# Patient Record
Sex: FEMALE | Race: BLACK OR AFRICAN AMERICAN
[De-identification: names, ages, dates, MRNs, and addresses within clinical notes are randomized per-mention and may not be internally consistent; named-entity substitution may affect disease eponyms.]

---

## 2006-08-19 VITALS — TEMPERATURE: 98.3 F | TEMPERATURE: 98.3 F | TEMPERATURE: 98.3 F | TEMPERATURE: 98.3 F

## 2018-10-05 ENCOUNTER — HOSPITAL ENCOUNTER (EMERGENCY)
Dept: HOSPITAL 19 - COL.ER | Age: 38
Discharge: HOME | End: 2018-10-05
Payer: MEDICAID

## 2018-10-05 VITALS — BODY MASS INDEX: 29.12 KG/M2 | WEIGHT: 160.28 LBS | HEIGHT: 62.01 IN

## 2018-10-05 VITALS — SYSTOLIC BLOOD PRESSURE: 131 MMHG | DIASTOLIC BLOOD PRESSURE: 92 MMHG | HEART RATE: 80 BPM

## 2018-10-05 VITALS — TEMPERATURE: 99.3 F

## 2018-10-05 DIAGNOSIS — F41.9: ICD-10-CM

## 2018-10-05 DIAGNOSIS — Z98.51: ICD-10-CM

## 2018-10-05 DIAGNOSIS — Z98.890: ICD-10-CM

## 2018-10-05 DIAGNOSIS — N92.0: Primary | ICD-10-CM

## 2018-10-05 LAB
ALBUMIN SERPL-MCNC: 3.7 GM/DL (ref 3.5–5)
ALP SERPL-CCNC: 68 U/L (ref 50–136)
ALT SERPL-CCNC: 33 U/L (ref 9–52)
ANION GAP SERPL CALC-SCNC: 3 MMOL/L (ref 7–16)
AST SERPL-CCNC: 25 U/L (ref 15–37)
BASOPHILS # BLD: 0 10*3/UL (ref 0–0.2)
BASOPHILS NFR BLD AUTO: 0.2 % (ref 0–2)
BILIRUB SERPL-MCNC: 0.2 MG/DL (ref 0–1)
BUN SERPL-MCNC: 12 MG/DL (ref 7–17)
CALCIUM SERPL-MCNC: 8.5 MG/DL (ref 8.4–10.2)
CHLORIDE SERPL-SCNC: 103 MMOL/L (ref 98–107)
CO2 SERPL-SCNC: 31 MMOL/L (ref 22–30)
CREAT SERPL-SCNC: 0.74 MG/DL (ref 0.52–1.25)
EOSINOPHIL # BLD: 0 10*3/UL (ref 0–0.7)
EOSINOPHIL NFR BLD: 0 % (ref 0–4)
ERYTHROCYTE [DISTWIDTH] IN BLOOD BY AUTOMATED COUNT: 13.2 % (ref 11.5–14.5)
GLUCOSE SERPL-MCNC: 90 MG/DL (ref 74–106)
GRANULOCYTES # BLD AUTO: 51.5 % (ref 42.2–75.2)
HCT VFR BLD AUTO: 39.2 % (ref 37–47)
HGB BLD-MCNC: 13.5 G/DL (ref 12.5–16)
LYMPHOCYTES # BLD: 1.5 10*3/UL (ref 1.2–3.4)
LYMPHOCYTES NFR BLD: 37.2 % (ref 20–51)
MCH RBC QN AUTO: 30 PG (ref 27–31)
MCHC RBC AUTO-ENTMCNC: 34 G/DL (ref 33–37)
MCV RBC AUTO: 86 FL (ref 80–100)
MONOCYTES # BLD: 0.5 10*3/UL (ref 0.1–0.6)
MONOCYTES NFR BLD AUTO: 11.1 % (ref 1.7–9.3)
NEUTROPHILS # BLD: 2.1 10*3/UL (ref 1.4–6.5)
PLATELET # BLD AUTO: 262 K/MM3 (ref 130–400)
PMV BLD AUTO: 10.6 FL (ref 7.4–10.4)
POTASSIUM SERPL-SCNC: 4.2 MMOL/L (ref 3.4–5)
PROT SERPL-MCNC: 7.1 GM/DL (ref 6.4–8.2)
RBC # BLD AUTO: 4.56 M/MM3 (ref 4.1–5.3)
SODIUM SERPL-SCNC: 136 MMOL/L (ref 137–145)

## 2018-12-04 ENCOUNTER — HOSPITAL ENCOUNTER (EMERGENCY)
Dept: HOSPITAL 19 - COL.ER | Age: 38
Discharge: HOME | End: 2018-12-04
Payer: MEDICAID

## 2018-12-04 VITALS — HEART RATE: 78 BPM

## 2018-12-04 VITALS — BODY MASS INDEX: 30.26 KG/M2 | WEIGHT: 160.28 LBS | HEIGHT: 60.98 IN

## 2018-12-04 VITALS — DIASTOLIC BLOOD PRESSURE: 92 MMHG | TEMPERATURE: 98.2 F | SYSTOLIC BLOOD PRESSURE: 135 MMHG

## 2018-12-04 DIAGNOSIS — F20.9: ICD-10-CM

## 2018-12-04 DIAGNOSIS — Z98.890: ICD-10-CM

## 2018-12-04 DIAGNOSIS — F12.90: ICD-10-CM

## 2018-12-04 DIAGNOSIS — F32.9: ICD-10-CM

## 2018-12-04 DIAGNOSIS — N39.0: ICD-10-CM

## 2018-12-04 DIAGNOSIS — R45.851: Primary | ICD-10-CM

## 2018-12-04 DIAGNOSIS — F17.210: ICD-10-CM

## 2018-12-04 LAB
ALBUMIN SERPL-MCNC: 3.9 GM/DL (ref 3.5–5)
ALP SERPL-CCNC: 78 U/L (ref 50–136)
ALT SERPL-CCNC: 37 U/L (ref 9–52)
ANION GAP SERPL CALC-SCNC: 2 MMOL/L (ref 7–16)
APAP SERPL-MCNC: < 10 UG/ML (ref 10–30)
AST SERPL-CCNC: 32 U/L (ref 15–37)
BASOPHILS # BLD: 0 10*3/UL (ref 0–0.2)
BASOPHILS NFR BLD AUTO: 0.2 % (ref 0–2)
BILIRUB SERPL-MCNC: 0.2 MG/DL (ref 0–1)
BUN SERPL-MCNC: 11 MG/DL (ref 7–17)
CALCIUM SERPL-MCNC: 9 MG/DL (ref 8.4–10.2)
CHLORIDE SERPL-SCNC: 109 MMOL/L (ref 98–107)
CO2 SERPL-SCNC: 31 MMOL/L (ref 22–30)
CREAT SERPL-SCNC: 0.9 MG/DL (ref 0.52–1.25)
DRUGS UR SCN NOM: NEGATIVE NG/ML
EOSINOPHIL # BLD: 0 10*3/UL (ref 0–0.7)
EOSINOPHIL NFR BLD: 0 % (ref 0–4)
ERYTHROCYTE [DISTWIDTH] IN BLOOD BY AUTOMATED COUNT: 12.6 % (ref 11.5–14.5)
ETHANOL SPEC-SCNC: < 10 MG/DL
GLUCOSE SERPL-MCNC: 89 MG/DL (ref 74–106)
GRANULOCYTES # BLD AUTO: 57.8 % (ref 42.2–75.2)
HCT VFR BLD AUTO: 41.1 % (ref 37–47)
HGB BLD-MCNC: 14.3 G/DL (ref 12.5–16)
LYMPHOCYTES # BLD: 2 10*3/UL (ref 1.2–3.4)
LYMPHOCYTES NFR BLD: 34.9 % (ref 20–51)
MCH RBC QN AUTO: 30 PG (ref 27–31)
MCHC RBC AUTO-ENTMCNC: 35 G/DL (ref 33–37)
MCV RBC AUTO: 86 FL (ref 80–100)
MONOCYTES # BLD: 0.4 10*3/UL (ref 0.1–0.6)
MONOCYTES NFR BLD AUTO: 6.9 % (ref 1.7–9.3)
NEUTROPHILS # BLD: 3.3 10*3/UL (ref 1.4–6.5)
PH UR STRIP.AUTO: 6 [PH] (ref 5–8)
PLATELET # BLD AUTO: 301 K/MM3 (ref 130–400)
PMV BLD AUTO: 9.8 FL (ref 7.4–10.4)
POTASSIUM SERPL-SCNC: 3.8 MMOL/L (ref 3.4–5)
PROT SERPL-MCNC: 7.3 GM/DL (ref 6.4–8.2)
RBC # BLD AUTO: 4.77 M/MM3 (ref 4.1–5.3)
RBC # UR STRIP.AUTO: (no result) /UL
RBC # UR: >50 /HPF
SALICYLATES SERPL-MCNC: < 1 MG/DL
SODIUM SERPL-SCNC: 142 MMOL/L (ref 137–145)
SP GR UR STRIP.AUTO: 1.01 (ref 1–1.03)
SQUAMOUS # URNS: (no result) /HPF
UA DIPSTICK PNL UR STRIP.AUTO: (no result)
URINE LEUKOCYTE ESTERASE: (no result)
URN COLLECT METHOD CLASS: (no result)

## 2019-04-27 ENCOUNTER — HOSPITAL ENCOUNTER (EMERGENCY)
Dept: HOSPITAL 19 - COL.ER | Age: 39
Discharge: HOME | End: 2019-04-27
Payer: MEDICAID

## 2019-04-27 VITALS — WEIGHT: 140.21 LBS | BODY MASS INDEX: 25.48 KG/M2 | HEIGHT: 62.01 IN

## 2019-04-27 VITALS — DIASTOLIC BLOOD PRESSURE: 93 MMHG | HEART RATE: 123 BPM | TEMPERATURE: 98 F | SYSTOLIC BLOOD PRESSURE: 140 MMHG

## 2019-04-27 DIAGNOSIS — Z11.3: ICD-10-CM

## 2019-04-27 DIAGNOSIS — Z98.51: ICD-10-CM

## 2019-04-27 DIAGNOSIS — F20.9: ICD-10-CM

## 2019-04-27 DIAGNOSIS — F17.210: ICD-10-CM

## 2019-04-27 DIAGNOSIS — Z88.0: ICD-10-CM

## 2019-04-27 DIAGNOSIS — H10.9: Primary | ICD-10-CM

## 2019-07-09 ENCOUNTER — HOSPITAL ENCOUNTER (EMERGENCY)
Dept: HOSPITAL 19 - COL.ER | Age: 39
LOS: 1 days | Discharge: HOME | End: 2019-07-10
Payer: MEDICAID

## 2019-07-09 VITALS — HEIGHT: 60.98 IN | BODY MASS INDEX: 28.39 KG/M2 | WEIGHT: 150.36 LBS

## 2019-07-09 VITALS — TEMPERATURE: 98.3 F

## 2019-07-09 DIAGNOSIS — Z53.21: ICD-10-CM

## 2019-07-09 DIAGNOSIS — R42: Primary | ICD-10-CM

## 2019-07-10 VITALS — DIASTOLIC BLOOD PRESSURE: 84 MMHG | HEART RATE: 83 BPM | SYSTOLIC BLOOD PRESSURE: 126 MMHG

## 2019-07-13 ENCOUNTER — HOSPITAL ENCOUNTER (EMERGENCY)
Dept: HOSPITAL 19 - COL.ER | Age: 39
Discharge: HOME | End: 2019-07-13
Payer: MEDICAID

## 2019-07-13 VITALS — HEIGHT: 60.98 IN | BODY MASS INDEX: 32.22 KG/M2 | WEIGHT: 170.64 LBS

## 2019-07-13 VITALS — DIASTOLIC BLOOD PRESSURE: 80 MMHG | TEMPERATURE: 99.4 F | SYSTOLIC BLOOD PRESSURE: 137 MMHG | HEART RATE: 126 BPM

## 2019-07-13 DIAGNOSIS — Z20.2: Primary | ICD-10-CM

## 2020-02-27 ENCOUNTER — HOSPITAL ENCOUNTER (EMERGENCY)
Dept: HOSPITAL 19 - COL.ER | Age: 40
Discharge: HOME | End: 2020-02-27
Payer: MEDICAID

## 2020-02-27 VITALS — TEMPERATURE: 98.8 F | DIASTOLIC BLOOD PRESSURE: 64 MMHG | SYSTOLIC BLOOD PRESSURE: 120 MMHG

## 2020-02-27 VITALS — HEIGHT: 60.98 IN | BODY MASS INDEX: 30.26 KG/M2 | WEIGHT: 160.28 LBS

## 2020-02-27 VITALS — HEART RATE: 89 BPM

## 2020-02-27 DIAGNOSIS — Z98.51: ICD-10-CM

## 2020-02-27 DIAGNOSIS — F17.210: ICD-10-CM

## 2020-02-27 DIAGNOSIS — N76.4: Primary | ICD-10-CM

## 2020-03-13 ENCOUNTER — HOSPITAL ENCOUNTER (EMERGENCY)
Dept: HOSPITAL 19 - COL.ER | Age: 40
Discharge: PSYCHIATRIC OR SUBSTANCE USE FACILITY | End: 2020-03-13
Payer: MEDICAID

## 2020-03-13 VITALS — DIASTOLIC BLOOD PRESSURE: 85 MMHG | SYSTOLIC BLOOD PRESSURE: 115 MMHG

## 2020-03-13 VITALS — WEIGHT: 175.27 LBS | HEIGHT: 62.01 IN | BODY MASS INDEX: 31.85 KG/M2

## 2020-03-13 VITALS — TEMPERATURE: 98.1 F

## 2020-03-13 VITALS — HEART RATE: 133 BPM

## 2020-03-13 DIAGNOSIS — F32.9: Primary | ICD-10-CM

## 2020-03-13 DIAGNOSIS — F17.210: ICD-10-CM

## 2020-03-13 LAB
ALBUMIN SERPL-MCNC: 4 GM/DL (ref 3.5–5)
ALP SERPL-CCNC: 75 U/L (ref 50–136)
ALT SERPL-CCNC: 63 U/L (ref 4–34)
ANION GAP SERPL CALC-SCNC: 6 MMOL/L (ref 7–16)
APAP SERPL-MCNC: < 10 UG/ML (ref 10–30)
AST SERPL-CCNC: 45 U/L (ref 15–37)
BASOPHILS # BLD: 0 10*3/UL (ref 0–0.2)
BASOPHILS NFR BLD AUTO: 0.3 % (ref 0–2)
BILIRUB SERPL-MCNC: 0.2 MG/DL (ref 0–1)
BUN SERPL-MCNC: 15 MG/DL (ref 7–17)
CALCIUM SERPL-MCNC: 8.9 MG/DL (ref 8.4–10.2)
CHLORIDE SERPL-SCNC: 106 MMOL/L (ref 98–107)
CO2 SERPL-SCNC: 28 MMOL/L (ref 22–30)
CREAT SERPL-SCNC: 0.78 UMOL/L (ref 0.52–1.25)
DRUGS UR SCN NOM: NEGATIVE NG/ML
EOSINOPHIL # BLD: 0 10*3/UL (ref 0–0.7)
EOSINOPHIL NFR BLD: 0 % (ref 0–4)
ERYTHROCYTE [DISTWIDTH] IN BLOOD BY AUTOMATED COUNT: 14.1 % (ref 11.5–14.5)
ETHANOL SPEC-SCNC: < 10 MG/DL
GLUCOSE SERPL-MCNC: 108 MG/DL (ref 74–106)
GRANULOCYTES # BLD AUTO: 66 % (ref 42.2–75.2)
HCG SERPL QL: NEGATIVE
HCT VFR BLD AUTO: 36.9 % (ref 37–47)
HGB BLD-MCNC: 12.5 G/DL (ref 12.5–16)
LYMPHOCYTES # BLD: 1.7 10*3/UL (ref 1.2–3.4)
LYMPHOCYTES NFR BLD: 26 % (ref 20–51)
MCH RBC QN AUTO: 29 PG (ref 27–31)
MCHC RBC AUTO-ENTMCNC: 34 G/DL (ref 33–37)
MCV RBC AUTO: 85 FL (ref 80–100)
MONOCYTES # BLD: 0.5 10*3/UL (ref 0.1–0.6)
MONOCYTES NFR BLD AUTO: 7.4 % (ref 1.7–9.3)
NEUTROPHILS # BLD: 4.3 10*3/UL (ref 1.4–6.5)
PH UR STRIP.AUTO: 6 [PH] (ref 5–8)
PLATELET # BLD AUTO: 261 K/MM3 (ref 130–400)
PMV BLD AUTO: 10 FL (ref 7.4–10.4)
POTASSIUM SERPL-SCNC: 4.1 MMOL/L (ref 3.4–5)
PROT SERPL-MCNC: 7.4 GM/DL (ref 6.4–8.2)
RBC # BLD AUTO: 4.33 M/MM3 (ref 4.1–5.3)
RBC # UR STRIP.AUTO: (no result) /UL
RBC # UR: (no result) /HPF
SALICYLATES SERPL-MCNC: < 1 MG/DL
SODIUM SERPL-SCNC: 140 MMOL/L (ref 137–145)
SP GR UR STRIP.AUTO: 1.01 (ref 1–1.03)
SQUAMOUS # URNS: (no result) /HPF
URN COLLECT METHOD CLASS: (no result)

## 2020-04-18 ENCOUNTER — HOSPITAL ENCOUNTER (EMERGENCY)
Dept: HOSPITAL 19 - COL.ER | Age: 40
LOS: 2 days | Discharge: PSYCHIATRIC OR SUBSTANCE USE FACILITY | End: 2020-04-20
Payer: MEDICAID

## 2020-04-18 VITALS — HEIGHT: 62.01 IN | WEIGHT: 192.46 LBS | BODY MASS INDEX: 34.97 KG/M2

## 2020-04-18 DIAGNOSIS — F17.210: ICD-10-CM

## 2020-04-18 DIAGNOSIS — F20.9: Primary | ICD-10-CM

## 2020-04-18 DIAGNOSIS — Z98.51: ICD-10-CM

## 2020-04-18 LAB
ALBUMIN SERPL-MCNC: 4.6 GM/DL (ref 3.5–5)
ALP SERPL-CCNC: 99 U/L (ref 50–136)
ALT SERPL-CCNC: 39 U/L (ref 4–34)
ANION GAP SERPL CALC-SCNC: 7 MMOL/L (ref 7–16)
APAP SERPL-MCNC: < 10 UG/ML (ref 10–30)
AST SERPL-CCNC: 39 U/L (ref 15–37)
BASOPHILS # BLD: 0.1 10*3/UL (ref 0–0.2)
BASOPHILS NFR BLD AUTO: 0.7 % (ref 0–2)
BILIRUB SERPL-MCNC: 0.4 MG/DL (ref 0–1)
BUN SERPL-MCNC: 14 MG/DL (ref 7–17)
CALCIUM SERPL-MCNC: 10.1 MG/DL (ref 8.4–10.2)
CHLORIDE SERPL-SCNC: 101 MMOL/L (ref 98–107)
CO2 SERPL-SCNC: 28 MMOL/L (ref 22–30)
CREAT SERPL-SCNC: 0.77 UMOL/L (ref 0.52–1.25)
DRUGS UR SCN NOM: NEGATIVE NG/ML
EOSINOPHIL # BLD: 0 10*3/UL (ref 0–0.7)
EOSINOPHIL NFR BLD: 0 % (ref 0–4)
ERYTHROCYTE [DISTWIDTH] IN BLOOD BY AUTOMATED COUNT: 13.5 % (ref 11.5–14.5)
ETHANOL SPEC-SCNC: < 10 MG/DL
GLUCOSE SERPL-MCNC: 109 MG/DL (ref 74–106)
GRANULOCYTES # BLD AUTO: 64.5 % (ref 42.2–75.2)
HCG SERPL QL: NEGATIVE
HCT VFR BLD AUTO: 37.7 % (ref 37–47)
HGB BLD-MCNC: 13.2 G/DL (ref 12.5–16)
LYMPHOCYTES # BLD: 2 10*3/UL (ref 1.2–3.4)
LYMPHOCYTES NFR BLD: 24.6 % (ref 20–51)
MAGNESIUM SERPL-MCNC: 1.8 MG/DL (ref 1.6–2.3)
MCH RBC QN AUTO: 29 PG (ref 27–31)
MCHC RBC AUTO-ENTMCNC: 35 G/DL (ref 33–37)
MCV RBC AUTO: 81 FL (ref 80–100)
MONOCYTES # BLD: 0.8 10*3/UL (ref 0.1–0.6)
MONOCYTES NFR BLD AUTO: 9.8 % (ref 1.7–9.3)
NEUTROPHILS # BLD: 5.2 10*3/UL (ref 1.4–6.5)
PH UR STRIP.AUTO: 7 [PH] (ref 5–8)
PHOSPHATE SERPL-MCNC: 5.4 MG/DL (ref 2.5–4.5)
PLATELET # BLD AUTO: 284 K/MM3 (ref 130–400)
PMV BLD AUTO: 9.7 FL (ref 7.4–10.4)
POTASSIUM SERPL-SCNC: 4.3 MMOL/L (ref 3.4–5)
PROT SERPL-MCNC: 8.3 GM/DL (ref 6.4–8.2)
RBC # BLD AUTO: 4.63 M/MM3 (ref 4.1–5.3)
RBC # UR: (no result) /HPF
SALICYLATES SERPL-MCNC: < 1 MG/DL
SODIUM SERPL-SCNC: 136 MMOL/L (ref 137–145)
SP GR UR STRIP.AUTO: 1 (ref 1–1.03)
TSH SERPL DL<=0.005 MIU/L-ACNC: 3.87 UIU/ML (ref 0.47–4.68)
URN COLLECT METHOD CLASS: (no result)

## 2020-04-20 VITALS — HEART RATE: 100 BPM | DIASTOLIC BLOOD PRESSURE: 82 MMHG | SYSTOLIC BLOOD PRESSURE: 128 MMHG

## 2020-04-20 VITALS — TEMPERATURE: 97.5 F

## 2020-05-11 ENCOUNTER — HOSPITAL ENCOUNTER (EMERGENCY)
Dept: HOSPITAL 19 - COL.ER | Age: 40
Discharge: HOME | End: 2020-05-11
Payer: MEDICAID

## 2020-05-11 VITALS — SYSTOLIC BLOOD PRESSURE: 123 MMHG | DIASTOLIC BLOOD PRESSURE: 82 MMHG | TEMPERATURE: 97.8 F

## 2020-05-11 VITALS — BODY MASS INDEX: 39.33 KG/M2 | WEIGHT: 208.34 LBS | HEIGHT: 60.98 IN

## 2020-05-11 VITALS — HEART RATE: 85 BPM

## 2020-05-11 DIAGNOSIS — F32.9: ICD-10-CM

## 2020-05-11 DIAGNOSIS — Z79.84: ICD-10-CM

## 2020-05-11 DIAGNOSIS — R10.13: Primary | ICD-10-CM

## 2020-05-11 LAB
ALBUMIN SERPL-MCNC: 4 GM/DL (ref 3.5–5)
ALP SERPL-CCNC: 80 U/L (ref 50–136)
ALT SERPL-CCNC: 40 U/L (ref 4–34)
ANION GAP SERPL CALC-SCNC: 4 MMOL/L (ref 7–16)
AST SERPL-CCNC: 32 U/L (ref 15–37)
BASOPHILS # BLD: 0.1 10*3/UL (ref 0–0.2)
BASOPHILS NFR BLD AUTO: 0.9 % (ref 0–2)
BILIRUB SERPL-MCNC: 0.3 MG/DL (ref 0–1)
BUN SERPL-MCNC: 15 MG/DL (ref 7–17)
CALCIUM SERPL-MCNC: 9.2 MG/DL (ref 8.4–10.2)
CHLORIDE SERPL-SCNC: 102 MMOL/L (ref 98–107)
CO2 SERPL-SCNC: 28 MMOL/L (ref 22–30)
CREAT SERPL-SCNC: 0.78 UMOL/L (ref 0.52–1.25)
CRP SERPL-MCNC: < 0.5 MG/DL (ref 0–0.9)
EOSINOPHIL # BLD: 0.1 10*3/UL (ref 0–0.7)
EOSINOPHIL NFR BLD: 1.9 % (ref 0–4)
ERYTHROCYTE [DISTWIDTH] IN BLOOD BY AUTOMATED COUNT: 13.7 % (ref 11.5–14.5)
GLUCOSE SERPL-MCNC: 102 MG/DL (ref 74–106)
GRANULOCYTES # BLD AUTO: 45.8 % (ref 42.2–75.2)
HCG SERPL QL: NEGATIVE
HCT VFR BLD AUTO: 35.5 % (ref 37–47)
HGB BLD-MCNC: 12.1 G/DL (ref 12.5–16)
LIPASE SERPL-CCNC: 51 U/L (ref 23–300)
LYMPHOCYTES # BLD: 2.4 10*3/UL (ref 1.2–3.4)
LYMPHOCYTES NFR BLD: 41.9 % (ref 20–51)
MCH RBC QN AUTO: 29 PG (ref 27–31)
MCHC RBC AUTO-ENTMCNC: 34 G/DL (ref 33–37)
MCV RBC AUTO: 84 FL (ref 80–100)
MONOCYTES # BLD: 0.5 10*3/UL (ref 0.1–0.6)
MONOCYTES NFR BLD AUTO: 9 % (ref 1.7–9.3)
NEUTROPHILS # BLD: 2.7 10*3/UL (ref 1.4–6.5)
PLATELET # BLD AUTO: 279 K/MM3 (ref 130–400)
PMV BLD AUTO: 9.8 FL (ref 7.4–10.4)
POTASSIUM SERPL-SCNC: 4.1 MMOL/L (ref 3.4–5)
PROT SERPL-MCNC: 7.6 GM/DL (ref 6.4–8.2)
RBC # BLD AUTO: 4.24 M/MM3 (ref 4.1–5.3)
SODIUM SERPL-SCNC: 134 MMOL/L (ref 137–145)

## 2020-05-11 PROCEDURE — C9113 INJ PANTOPRAZOLE SODIUM, VIA: HCPCS

## 2020-06-07 ENCOUNTER — HOSPITAL ENCOUNTER (EMERGENCY)
Dept: HOSPITAL 19 - COL.ER | Age: 40
Discharge: HOME | End: 2020-06-07
Payer: MEDICAID

## 2020-06-07 VITALS — DIASTOLIC BLOOD PRESSURE: 89 MMHG | SYSTOLIC BLOOD PRESSURE: 122 MMHG | HEART RATE: 89 BPM

## 2020-06-07 VITALS — WEIGHT: 215.39 LBS | BODY MASS INDEX: 40.67 KG/M2 | HEIGHT: 60.98 IN

## 2020-06-07 VITALS — TEMPERATURE: 98.3 F

## 2020-06-07 DIAGNOSIS — F22: ICD-10-CM

## 2020-06-07 DIAGNOSIS — F20.9: ICD-10-CM

## 2020-06-07 DIAGNOSIS — J90: Primary | ICD-10-CM

## 2020-06-07 DIAGNOSIS — F17.210: ICD-10-CM

## 2020-06-07 DIAGNOSIS — R07.89: ICD-10-CM

## 2020-06-07 LAB
ALBUMIN SERPL-MCNC: 3.9 GM/DL (ref 3.5–5)
ALP SERPL-CCNC: 93 U/L (ref 50–136)
ALT SERPL-CCNC: 17 U/L (ref 4–34)
ANION GAP SERPL CALC-SCNC: 7 MMOL/L (ref 7–16)
AST SERPL-CCNC: 28 U/L (ref 15–37)
BASOPHILS # BLD: 0 10*3/UL (ref 0–0.2)
BASOPHILS NFR BLD AUTO: 0.5 % (ref 0–2)
BILIRUB SERPL-MCNC: 0.5 MG/DL (ref 0–1)
BUN SERPL-MCNC: 17 MG/DL (ref 7–17)
CALCIUM SERPL-MCNC: 9.4 MG/DL (ref 8.4–10.2)
CHLORIDE SERPL-SCNC: 102 MMOL/L (ref 98–107)
CO2 SERPL-SCNC: 28 MMOL/L (ref 22–30)
CREAT SERPL-SCNC: 0.9 UMOL/L (ref 0.52–1.25)
EOSINOPHIL # BLD: 0 10*3/UL (ref 0–0.7)
EOSINOPHIL NFR BLD: 0 % (ref 0–4)
ERYTHROCYTE [DISTWIDTH] IN BLOOD BY AUTOMATED COUNT: 13.1 % (ref 11.5–14.5)
GLUCOSE SERPL-MCNC: 84 MG/DL (ref 74–106)
GRANULOCYTES # BLD AUTO: 60.5 % (ref 42.2–75.2)
HCG SERPL QL: NEGATIVE
HCT VFR BLD AUTO: 36.6 % (ref 37–47)
HGB BLD-MCNC: 12.6 G/DL (ref 12.5–16)
LYMPHOCYTES # BLD: 2.3 10*3/UL (ref 1.2–3.4)
LYMPHOCYTES NFR BLD: 30.2 % (ref 20–51)
MCH RBC QN AUTO: 28 PG (ref 27–31)
MCHC RBC AUTO-ENTMCNC: 34 G/DL (ref 33–37)
MCV RBC AUTO: 81 FL (ref 80–100)
MONOCYTES # BLD: 0.7 10*3/UL (ref 0.1–0.6)
MONOCYTES NFR BLD AUTO: 8.5 % (ref 1.7–9.3)
NEUTROPHILS # BLD: 4.6 10*3/UL (ref 1.4–6.5)
PLATELET # BLD AUTO: 258 K/MM3 (ref 130–400)
PMV BLD AUTO: 10.5 FL (ref 7.4–10.4)
POTASSIUM SERPL-SCNC: 4.2 MMOL/L (ref 3.4–5)
PROT SERPL-MCNC: 7.4 GM/DL (ref 6.4–8.2)
RBC # BLD AUTO: 4.51 M/MM3 (ref 4.1–5.3)
SODIUM SERPL-SCNC: 136 MMOL/L (ref 137–145)
TROPONIN I SERPL-MCNC: < 0.012 NG/ML (ref 0–0.04)

## 2020-06-12 ENCOUNTER — HOSPITAL ENCOUNTER (EMERGENCY)
Dept: HOSPITAL 19 - COL.ER | Age: 40
Discharge: LEFT BEFORE DISCHARGE | End: 2020-06-12
Payer: MEDICAID

## 2020-06-12 VITALS — DIASTOLIC BLOOD PRESSURE: 77 MMHG | TEMPERATURE: 98.3 F | SYSTOLIC BLOOD PRESSURE: 110 MMHG | HEART RATE: 90 BPM

## 2020-06-12 VITALS — BODY MASS INDEX: 39.14 KG/M2 | HEIGHT: 62.01 IN | WEIGHT: 215.39 LBS

## 2020-06-12 DIAGNOSIS — R06.02: Primary | ICD-10-CM

## 2020-06-12 DIAGNOSIS — R07.89: ICD-10-CM

## 2020-06-18 ENCOUNTER — HOSPITAL ENCOUNTER (OUTPATIENT)
Dept: HOSPITAL 19 - COL.RAD | Age: 40
End: 2020-06-18
Attending: NURSE PRACTITIONER
Payer: MEDICAID

## 2020-06-18 DIAGNOSIS — R60.0: Primary | ICD-10-CM

## 2020-06-18 DIAGNOSIS — R05: ICD-10-CM

## 2020-06-23 ENCOUNTER — HOSPITAL ENCOUNTER (OUTPATIENT)
Dept: HOSPITAL 19 - COL.PUL | Age: 40
End: 2020-06-23
Attending: INTERNAL MEDICINE
Payer: MEDICAID

## 2020-06-23 DIAGNOSIS — J45.909: ICD-10-CM

## 2020-06-23 DIAGNOSIS — J90: Primary | ICD-10-CM

## 2020-06-23 DIAGNOSIS — F17.210: ICD-10-CM

## 2020-06-23 LAB
ALBUMIN SERPL-MCNC: 3.9 GM/DL (ref 3.5–5)
ALP SERPL-CCNC: 91 U/L (ref 50–136)
ALT SERPL-CCNC: 56 U/L (ref 4–34)
ANION GAP SERPL CALC-SCNC: 5 MMOL/L (ref 7–16)
AST SERPL-CCNC: 42 U/L (ref 15–37)
BASOPHILS # BLD: 0.1 10*3/UL (ref 0–0.2)
BASOPHILS NFR BLD AUTO: 1.2 % (ref 0–2)
BILIRUB SERPL-MCNC: 0.2 MG/DL (ref 0–1)
BUN SERPL-MCNC: 15 MG/DL (ref 7–17)
CALCIUM SERPL-MCNC: 9.3 MG/DL (ref 8.4–10.2)
CHLORIDE SERPL-SCNC: 102 MMOL/L (ref 98–107)
CO2 SERPL-SCNC: 31 MMOL/L (ref 22–30)
CREAT SERPL-SCNC: 0.95 UMOL/L (ref 0.52–1.25)
CRP SERPL-MCNC: 0.5 MG/DL (ref 0–0.9)
EOSINOPHIL # BLD: 0 10*3/UL (ref 0–0.7)
EOSINOPHIL NFR BLD: 0 % (ref 0–4)
ERYTHROCYTE [DISTWIDTH] IN BLOOD BY AUTOMATED COUNT: 13.1 % (ref 11.5–14.5)
GLUCOSE SERPL-MCNC: 111 MG/DL (ref 74–106)
GRANULOCYTES # BLD AUTO: 59.1 % (ref 42.2–75.2)
HCT VFR BLD AUTO: 38.4 % (ref 37–47)
HGB BLD-MCNC: 12.7 G/DL (ref 12.5–16)
LYMPHOCYTES # BLD: 1.9 10*3/UL (ref 1.2–3.4)
LYMPHOCYTES NFR BLD: 30.5 % (ref 20–51)
MCH RBC QN AUTO: 28 PG (ref 27–31)
MCHC RBC AUTO-ENTMCNC: 33 G/DL (ref 33–37)
MCV RBC AUTO: 83 FL (ref 80–100)
MONOCYTES # BLD: 0.5 10*3/UL (ref 0.1–0.6)
MONOCYTES NFR BLD AUTO: 8.9 % (ref 1.7–9.3)
NEUTROPHILS # BLD: 3.6 10*3/UL (ref 1.4–6.5)
PLATELET # BLD AUTO: 259 K/MM3 (ref 130–400)
PMV BLD AUTO: 9.4 FL (ref 7.4–10.4)
POTASSIUM SERPL-SCNC: 4.5 MMOL/L (ref 3.4–5)
PROT SERPL-MCNC: 7.4 GM/DL (ref 6.4–8.2)
RBC # BLD AUTO: 4.61 M/MM3 (ref 4.1–5.3)
SODIUM SERPL-SCNC: 138 MMOL/L (ref 137–145)

## 2020-06-24 LAB — PROCALCITONIN SERPL-MCNC: 0.02 NG/ML (ref 0–0.09)

## 2020-06-25 ENCOUNTER — HOSPITAL ENCOUNTER (OUTPATIENT)
Dept: HOSPITAL 19 - COL.RAD | Age: 40
End: 2020-06-25
Attending: NURSE PRACTITIONER
Payer: MEDICAID

## 2020-06-25 DIAGNOSIS — K56.41: Primary | ICD-10-CM

## 2020-06-25 DIAGNOSIS — R60.0: ICD-10-CM

## 2020-06-25 DIAGNOSIS — J90: ICD-10-CM

## 2020-06-25 DIAGNOSIS — F17.210: ICD-10-CM

## 2020-06-25 DIAGNOSIS — R06.02: ICD-10-CM

## 2020-06-25 LAB — ACE SERPL-CCNC: 42 U/L (ref 16–85)

## 2020-07-22 ENCOUNTER — HOSPITAL ENCOUNTER (EMERGENCY)
Dept: HOSPITAL 19 - COL.ER | Age: 40
Discharge: HOME | End: 2020-07-22
Payer: MEDICAID

## 2020-07-22 VITALS — TEMPERATURE: 97.7 F | SYSTOLIC BLOOD PRESSURE: 117 MMHG | HEART RATE: 106 BPM | DIASTOLIC BLOOD PRESSURE: 83 MMHG

## 2020-07-22 VITALS — WEIGHT: 215.39 LBS | BODY MASS INDEX: 39.14 KG/M2 | HEIGHT: 62.01 IN

## 2020-07-22 DIAGNOSIS — Z88.0: ICD-10-CM

## 2020-07-22 DIAGNOSIS — T50.902A: Primary | ICD-10-CM

## 2020-07-22 DIAGNOSIS — J91.8: ICD-10-CM

## 2020-07-22 DIAGNOSIS — Z79.84: ICD-10-CM

## 2020-07-22 DIAGNOSIS — R09.1: ICD-10-CM

## 2020-07-22 DIAGNOSIS — Z88.1: ICD-10-CM

## 2020-07-22 LAB
ALBUMIN SERPL-MCNC: 4.2 GM/DL (ref 3.5–5)
ALP SERPL-CCNC: 95 U/L (ref 50–136)
ALT SERPL-CCNC: 26 U/L (ref 4–34)
ANION GAP SERPL CALC-SCNC: 8 MMOL/L (ref 7–16)
APAP SERPL-MCNC: < 10 UG/ML (ref 10–30)
AST SERPL-CCNC: 25 U/L (ref 15–37)
BASOPHILS # BLD: 0.1 10*3/UL (ref 0–0.2)
BASOPHILS NFR BLD AUTO: 0.7 % (ref 0–2)
BILIRUB SERPL-MCNC: 0.4 MG/DL (ref 0–1)
BUN SERPL-MCNC: 14 MG/DL (ref 7–17)
CALCIUM SERPL-MCNC: 9.6 MG/DL (ref 8.4–10.2)
CHLORIDE SERPL-SCNC: 101 MMOL/L (ref 98–107)
CO2 SERPL-SCNC: 28 MMOL/L (ref 22–30)
CREAT SERPL-SCNC: 0.96 UMOL/L (ref 0.52–1.25)
DRUGS UR SCN NOM: NEGATIVE NG/ML
EOSINOPHIL # BLD: 0 10*3/UL (ref 0–0.7)
EOSINOPHIL NFR BLD: 0 % (ref 0–4)
ERYTHROCYTE [DISTWIDTH] IN BLOOD BY AUTOMATED COUNT: 13.8 % (ref 11.5–14.5)
ETHANOL SPEC-SCNC: < 10 MG/DL
GLUCOSE SERPL-MCNC: 105 MG/DL (ref 74–106)
GRANULOCYTES # BLD AUTO: 63.3 % (ref 42.2–75.2)
HCT VFR BLD AUTO: 36.6 % (ref 37–47)
HGB BLD-MCNC: 12.4 G/DL (ref 12.5–16)
LYMPHOCYTES # BLD: 2.3 10*3/UL (ref 1.2–3.4)
LYMPHOCYTES NFR BLD: 24.8 % (ref 20–51)
MCH RBC QN AUTO: 27 PG (ref 27–31)
MCHC RBC AUTO-ENTMCNC: 34 G/DL (ref 33–37)
MCV RBC AUTO: 81 FL (ref 80–100)
MONOCYTES # BLD: 1 10*3/UL (ref 0.1–0.6)
MONOCYTES NFR BLD AUTO: 10.8 % (ref 1.7–9.3)
NEUTROPHILS # BLD: 5.9 10*3/UL (ref 1.4–6.5)
PLATELET # BLD AUTO: 249 K/MM3 (ref 130–400)
PMV BLD AUTO: 9.8 FL (ref 7.4–10.4)
POTASSIUM SERPL-SCNC: 4.3 MMOL/L (ref 3.4–5)
PROT SERPL-MCNC: 7.9 GM/DL (ref 6.4–8.2)
RBC # BLD AUTO: 4.52 M/MM3 (ref 4.1–5.3)
SALICYLATES SERPL-MCNC: < 1 MG/DL
SODIUM SERPL-SCNC: 138 MMOL/L (ref 137–145)

## 2020-10-08 ENCOUNTER — HOSPITAL ENCOUNTER (EMERGENCY)
Dept: HOSPITAL 19 - COL.ER | Age: 40
Discharge: HOME | End: 2020-10-08
Payer: MEDICAID

## 2020-10-08 VITALS — SYSTOLIC BLOOD PRESSURE: 112 MMHG | DIASTOLIC BLOOD PRESSURE: 75 MMHG | TEMPERATURE: 98.4 F

## 2020-10-08 VITALS — HEIGHT: 62.01 IN | WEIGHT: 205.47 LBS | BODY MASS INDEX: 37.33 KG/M2

## 2020-10-08 VITALS — HEART RATE: 88 BPM

## 2020-10-08 DIAGNOSIS — Z88.0: ICD-10-CM

## 2020-10-08 DIAGNOSIS — F32.9: ICD-10-CM

## 2020-10-08 DIAGNOSIS — F20.9: ICD-10-CM

## 2020-10-08 DIAGNOSIS — F41.9: ICD-10-CM

## 2020-10-08 DIAGNOSIS — Z79.84: ICD-10-CM

## 2020-10-08 DIAGNOSIS — F17.210: ICD-10-CM

## 2020-10-08 DIAGNOSIS — Z88.1: ICD-10-CM

## 2020-10-08 DIAGNOSIS — R07.89: Primary | ICD-10-CM

## 2020-10-08 LAB
ALBUMIN SERPL-MCNC: 4.5 GM/DL (ref 3.5–5)
ALP SERPL-CCNC: 103 U/L (ref 50–136)
ALT SERPL-CCNC: 51 U/L (ref 4–34)
ANION GAP SERPL CALC-SCNC: 9 MMOL/L (ref 7–16)
AST SERPL-CCNC: 39 U/L (ref 15–37)
BASOPHILS # BLD: 0.1 10*3/UL (ref 0–0.2)
BASOPHILS NFR BLD AUTO: 0.8 % (ref 0–2)
BILIRUB SERPL-MCNC: 0.4 MG/DL (ref 0–1)
BUN SERPL-MCNC: 15 MG/DL (ref 7–17)
CALCIUM SERPL-MCNC: 9.3 MG/DL (ref 8.4–10.2)
CHLORIDE SERPL-SCNC: 102 MMOL/L (ref 98–107)
CO2 SERPL-SCNC: 29 MMOL/L (ref 22–30)
CREAT SERPL-SCNC: 0.95 UMOL/L (ref 0.52–1.25)
EOSINOPHIL # BLD: 0 10*3/UL (ref 0–0.7)
EOSINOPHIL NFR BLD: 0 % (ref 0–4)
ERYTHROCYTE [DISTWIDTH] IN BLOOD BY AUTOMATED COUNT: 15 % (ref 11.5–14.5)
GLUCOSE SERPL-MCNC: 80 MG/DL (ref 74–106)
GRANULOCYTES # BLD AUTO: 65.6 % (ref 42.2–75.2)
HCT VFR BLD AUTO: 39.3 % (ref 37–47)
HGB BLD-MCNC: 13.2 G/DL (ref 12.5–16)
LYMPHOCYTES # BLD: 2.1 10*3/UL (ref 1.2–3.4)
LYMPHOCYTES NFR BLD: 26.7 % (ref 20–51)
MCH RBC QN AUTO: 27 PG (ref 27–31)
MCHC RBC AUTO-ENTMCNC: 34 G/DL (ref 33–37)
MCV RBC AUTO: 80 FL (ref 80–100)
MONOCYTES # BLD: 0.5 10*3/UL (ref 0.1–0.6)
MONOCYTES NFR BLD AUTO: 6.6 % (ref 1.7–9.3)
NEUTROPHILS # BLD: 5.1 10*3/UL (ref 1.4–6.5)
PLATELET # BLD AUTO: 234 K/MM3 (ref 130–400)
PMV BLD AUTO: 10.1 FL (ref 7.4–10.4)
POTASSIUM SERPL-SCNC: 4.1 MMOL/L (ref 3.4–5)
PROT SERPL-MCNC: 8 GM/DL (ref 6.4–8.2)
RBC # BLD AUTO: 4.91 M/MM3 (ref 4.1–5.3)
SODIUM SERPL-SCNC: 140 MMOL/L (ref 137–145)
TROPONIN I SERPL-MCNC: < 0.012 NG/ML (ref 0–0.04)

## 2020-11-15 ENCOUNTER — HOSPITAL ENCOUNTER (EMERGENCY)
Dept: HOSPITAL 19 - COL.ER | Age: 40
Discharge: HOME | End: 2020-11-15
Attending: EMERGENCY MEDICINE
Payer: MEDICAID

## 2020-11-15 VITALS — HEART RATE: 108 BPM

## 2020-11-15 VITALS — TEMPERATURE: 97.9 F | DIASTOLIC BLOOD PRESSURE: 126 MMHG | SYSTOLIC BLOOD PRESSURE: 194 MMHG

## 2020-11-15 VITALS — BODY MASS INDEX: 38.26 KG/M2 | WEIGHT: 210.54 LBS | HEIGHT: 62.01 IN

## 2020-11-15 DIAGNOSIS — Z88.1: ICD-10-CM

## 2020-11-15 DIAGNOSIS — R45.851: Primary | ICD-10-CM

## 2020-11-15 DIAGNOSIS — Z88.0: ICD-10-CM

## 2020-11-15 DIAGNOSIS — Z79.84: ICD-10-CM

## 2020-11-15 DIAGNOSIS — F32.9: ICD-10-CM

## 2020-11-15 LAB
ALBUMIN SERPL-MCNC: 4.5 GM/DL (ref 3.5–5)
ALP SERPL-CCNC: 108 U/L (ref 50–136)
ALT SERPL-CCNC: 114 U/L (ref 4–34)
ANION GAP SERPL CALC-SCNC: 7 MMOL/L (ref 7–16)
APAP SERPL-MCNC: < 10 UG/ML (ref 10–30)
AST SERPL-CCNC: 46 U/L (ref 15–37)
BASOPHILS # BLD: 0.1 10*3/UL (ref 0–0.2)
BASOPHILS NFR BLD AUTO: 1.1 % (ref 0–2)
BILIRUB SERPL-MCNC: 0.3 MG/DL (ref 0–1)
BUN SERPL-MCNC: 15 MG/DL (ref 7–17)
CALCIUM SERPL-MCNC: 9.3 MG/DL (ref 8.4–10.2)
CHLORIDE SERPL-SCNC: 100 MMOL/L (ref 98–107)
CO2 SERPL-SCNC: 32 MMOL/L (ref 22–30)
CREAT SERPL-SCNC: 1.04 UMOL/L (ref 0.52–1.25)
DRUGS UR SCN NOM: NEGATIVE NG/ML
EOSINOPHIL # BLD: 0 10*3/UL (ref 0–0.7)
EOSINOPHIL NFR BLD: 0 % (ref 0–4)
ERYTHROCYTE [DISTWIDTH] IN BLOOD BY AUTOMATED COUNT: 14.6 % (ref 11.5–14.5)
ETHANOL SPEC-SCNC: < 10 MG/DL
GLUCOSE SERPL-MCNC: 90 MG/DL (ref 74–106)
GRANULOCYTES # BLD AUTO: 62.3 % (ref 42.2–75.2)
HCT VFR BLD AUTO: 38.8 % (ref 37–47)
HGB BLD-MCNC: 13.1 G/DL (ref 12.5–16)
LYMPHOCYTES # BLD: 2.5 10*3/UL (ref 1.2–3.4)
LYMPHOCYTES NFR BLD: 29.5 % (ref 20–51)
MCH RBC QN AUTO: 27 PG (ref 27–31)
MCHC RBC AUTO-ENTMCNC: 34 G/DL (ref 33–37)
MCV RBC AUTO: 80 FL (ref 80–100)
MONOCYTES # BLD: 0.6 10*3/UL (ref 0.1–0.6)
MONOCYTES NFR BLD AUTO: 6.5 % (ref 1.7–9.3)
NEUTROPHILS # BLD: 5.3 10*3/UL (ref 1.4–6.5)
PH UR STRIP.AUTO: 6 [PH] (ref 5–8)
PLATELET # BLD AUTO: 269 K/MM3 (ref 130–400)
PMV BLD AUTO: 10.2 FL (ref 7.4–10.4)
POTASSIUM SERPL-SCNC: 3.9 MMOL/L (ref 3.4–5)
PROT SERPL-MCNC: 7.7 GM/DL (ref 6.4–8.2)
RBC # BLD AUTO: 4.84 M/MM3 (ref 4.1–5.3)
SALICYLATES SERPL-MCNC: < 1 MG/DL
SODIUM SERPL-SCNC: 139 MMOL/L (ref 137–145)
SP GR UR STRIP.AUTO: 1 (ref 1–1.03)
SQUAMOUS # URNS: (no result) /HPF
URN COLLECT METHOD CLASS: (no result)

## 2020-11-17 ENCOUNTER — HOSPITAL ENCOUNTER (EMERGENCY)
Dept: HOSPITAL 19 - COL.ER | Age: 40
LOS: 1 days | Discharge: PSYCHIATRIC OR SUBSTANCE USE FACILITY | End: 2020-11-18
Attending: FAMILY MEDICINE
Payer: MEDICAID

## 2020-11-17 VITALS — BODY MASS INDEX: 39.14 KG/M2 | WEIGHT: 215.39 LBS | HEIGHT: 62.01 IN

## 2020-11-17 DIAGNOSIS — T43.222A: Primary | ICD-10-CM

## 2020-11-17 DIAGNOSIS — Z88.1: ICD-10-CM

## 2020-11-17 DIAGNOSIS — Z20.828: ICD-10-CM

## 2020-11-17 DIAGNOSIS — Z88.0: ICD-10-CM

## 2020-11-17 DIAGNOSIS — F41.9: ICD-10-CM

## 2020-11-17 DIAGNOSIS — F17.200: ICD-10-CM

## 2020-11-17 DIAGNOSIS — Z32.02: ICD-10-CM

## 2020-11-17 DIAGNOSIS — Z79.84: ICD-10-CM

## 2020-11-17 DIAGNOSIS — F32.9: ICD-10-CM

## 2020-11-17 LAB
ALBUMIN SERPL-MCNC: 4.4 GM/DL (ref 3.5–5)
ALP SERPL-CCNC: 111 U/L (ref 50–136)
ALT SERPL-CCNC: 96 U/L (ref 4–34)
ANION GAP SERPL CALC-SCNC: 7 MMOL/L (ref 7–16)
APAP SERPL-MCNC: < 10 UG/ML (ref 10–30)
AST SERPL-CCNC: 48 U/L (ref 15–37)
BASOPHILS # BLD: 0.1 10*3/UL (ref 0–0.2)
BASOPHILS NFR BLD AUTO: 1 % (ref 0–2)
BILIRUB SERPL-MCNC: 0.3 MG/DL (ref 0–1)
BUN SERPL-MCNC: 9 MG/DL (ref 7–17)
CALCIUM SERPL-MCNC: 9.6 MG/DL (ref 8.4–10.2)
CHLORIDE SERPL-SCNC: 102 MMOL/L (ref 98–107)
CO2 SERPL-SCNC: 32 MMOL/L (ref 22–30)
CREAT SERPL-SCNC: 1.16 UMOL/L (ref 0.52–1.25)
DRUGS UR SCN NOM: NEGATIVE NG/ML
EOSINOPHIL # BLD: 0 10*3/UL (ref 0–0.7)
EOSINOPHIL NFR BLD: 0 % (ref 0–4)
ERYTHROCYTE [DISTWIDTH] IN BLOOD BY AUTOMATED COUNT: 14.6 % (ref 11.5–14.5)
ETHANOL SPEC-SCNC: < 10 MG/DL
GLUCOSE SERPL-MCNC: 53 MG/DL (ref 74–106)
GRANULOCYTES # BLD AUTO: 56.4 % (ref 42.2–75.2)
HCT VFR BLD AUTO: 39 % (ref 37–47)
HGB BLD-MCNC: 13.1 G/DL (ref 12.5–16)
LYMPHOCYTES # BLD: 2.9 10*3/UL (ref 1.2–3.4)
LYMPHOCYTES NFR BLD: 34.6 % (ref 20–51)
MCH RBC QN AUTO: 27 PG (ref 27–31)
MCHC RBC AUTO-ENTMCNC: 34 G/DL (ref 33–37)
MCV RBC AUTO: 80 FL (ref 80–100)
MONOCYTES # BLD: 0.6 10*3/UL (ref 0.1–0.6)
MONOCYTES NFR BLD AUTO: 7.4 % (ref 1.7–9.3)
NEUTROPHILS # BLD: 4.7 10*3/UL (ref 1.4–6.5)
PH UR STRIP.AUTO: 7 [PH] (ref 5–8)
PLATELET # BLD AUTO: 299 K/MM3 (ref 130–400)
PMV BLD AUTO: 9.5 FL (ref 7.4–10.4)
POTASSIUM SERPL-SCNC: 4 MMOL/L (ref 3.4–5)
PROT SERPL-MCNC: 7.6 GM/DL (ref 6.4–8.2)
RBC # BLD AUTO: 4.85 M/MM3 (ref 4.1–5.3)
RBC # UR: (no result) /HPF
SALICYLATES SERPL-MCNC: < 1 MG/DL
SODIUM SERPL-SCNC: 140 MMOL/L (ref 137–145)
SP GR UR STRIP.AUTO: 1.01 (ref 1–1.03)
SQUAMOUS # URNS: (no result) /HPF
URN COLLECT METHOD CLASS: (no result)

## 2020-11-18 VITALS — TEMPERATURE: 98.4 F | HEART RATE: 98 BPM | DIASTOLIC BLOOD PRESSURE: 71 MMHG | SYSTOLIC BLOOD PRESSURE: 123 MMHG

## 2020-12-06 ENCOUNTER — HOSPITAL ENCOUNTER (EMERGENCY)
Dept: HOSPITAL 19 - COL.ER | Age: 40
End: 2020-12-06
Payer: MEDICAID

## 2020-12-06 DIAGNOSIS — Z79.84: ICD-10-CM

## 2020-12-06 DIAGNOSIS — Z88.0: ICD-10-CM

## 2020-12-06 DIAGNOSIS — Z88.1: ICD-10-CM

## 2020-12-06 DIAGNOSIS — Z53.21: Primary | ICD-10-CM

## 2021-02-10 ENCOUNTER — HOSPITAL ENCOUNTER (EMERGENCY)
Dept: HOSPITAL 19 - COL.ER | Age: 41
Discharge: HOME | End: 2021-02-10
Payer: MEDICAID

## 2021-02-10 VITALS — WEIGHT: 206 LBS | BODY MASS INDEX: 37.43 KG/M2 | HEIGHT: 62.01 IN

## 2021-02-10 VITALS — DIASTOLIC BLOOD PRESSURE: 90 MMHG | HEART RATE: 109 BPM | SYSTOLIC BLOOD PRESSURE: 142 MMHG

## 2021-02-10 VITALS — TEMPERATURE: 98 F

## 2021-02-10 DIAGNOSIS — F17.210: ICD-10-CM

## 2021-02-10 DIAGNOSIS — Z88.1: ICD-10-CM

## 2021-02-10 DIAGNOSIS — F25.9: ICD-10-CM

## 2021-02-10 DIAGNOSIS — Z88.0: ICD-10-CM

## 2021-02-10 DIAGNOSIS — K08.89: Primary | ICD-10-CM

## 2021-03-23 ENCOUNTER — HOSPITAL ENCOUNTER (EMERGENCY)
Dept: HOSPITAL 19 - COL.ER | Age: 41
Discharge: HOME | End: 2021-03-23
Attending: EMERGENCY MEDICINE
Payer: MEDICAID

## 2021-03-23 VITALS — BODY MASS INDEX: 37.49 KG/M2 | HEIGHT: 62.01 IN | WEIGHT: 206.35 LBS

## 2021-03-23 VITALS — DIASTOLIC BLOOD PRESSURE: 64 MMHG | HEART RATE: 98 BPM | SYSTOLIC BLOOD PRESSURE: 136 MMHG | TEMPERATURE: 98.4 F

## 2021-03-23 DIAGNOSIS — Z88.0: ICD-10-CM

## 2021-03-23 DIAGNOSIS — F20.9: ICD-10-CM
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
## 2021-03-23 DIAGNOSIS — S82.64XA: Primary | ICD-10-CM

## 2021-03-23 DIAGNOSIS — F17.210: ICD-10-CM

## 2021-03-23 DIAGNOSIS — Z79.84: ICD-10-CM

## 2021-03-23 DIAGNOSIS — Z88.1: ICD-10-CM

## 2021-08-30 ENCOUNTER — HOSPITAL ENCOUNTER (EMERGENCY)
Dept: HOSPITAL 19 - COL.ER | Age: 41
Discharge: HOME | End: 2021-08-30
Attending: PERSONAL EMERGENCY RESPONSE ATTENDANT
Payer: MEDICAID

## 2021-08-30 VITALS — HEART RATE: 104 BPM

## 2021-08-30 VITALS — BODY MASS INDEX: 35.27 KG/M2 | WEIGHT: 199.08 LBS | HEIGHT: 62.99 IN

## 2021-08-30 VITALS — TEMPERATURE: 98.6 F | SYSTOLIC BLOOD PRESSURE: 104 MMHG | DIASTOLIC BLOOD PRESSURE: 73 MMHG

## 2021-08-30 DIAGNOSIS — F17.200: ICD-10-CM

## 2021-08-30 DIAGNOSIS — F20.9: ICD-10-CM

## 2021-08-30 DIAGNOSIS — N61.1: Primary | ICD-10-CM

## 2021-08-30 DIAGNOSIS — Z79.899: ICD-10-CM

## 2022-02-21 ENCOUNTER — HOSPITAL ENCOUNTER (EMERGENCY)
Dept: HOSPITAL 19 - COL.ER | Age: 42
Discharge: LEFT BEFORE DISCHARGE | End: 2022-02-21
Attending: EMERGENCY MEDICINE
Payer: MEDICAID

## 2022-02-21 VITALS — SYSTOLIC BLOOD PRESSURE: 107 MMHG | HEART RATE: 100 BPM | TEMPERATURE: 97 F | DIASTOLIC BLOOD PRESSURE: 70 MMHG

## 2022-02-21 VITALS — HEIGHT: 62.01 IN | BODY MASS INDEX: 35.69 KG/M2 | WEIGHT: 196.43 LBS

## 2022-02-21 DIAGNOSIS — Z91.14: ICD-10-CM

## 2022-02-21 DIAGNOSIS — F20.9: Primary | ICD-10-CM

## 2022-02-21 DIAGNOSIS — Z79.899: ICD-10-CM

## 2023-02-06 ENCOUNTER — HOSPITAL ENCOUNTER (EMERGENCY)
Dept: HOSPITAL 19 - COL.ER | Age: 43
LOS: 1 days | Discharge: HOME | End: 2023-02-07
Attending: STUDENT IN AN ORGANIZED HEALTH CARE EDUCATION/TRAINING PROGRAM
Payer: MEDICAID

## 2023-02-06 VITALS — HEIGHT: 60.98 IN | WEIGHT: 212.53 LBS | BODY MASS INDEX: 40.12 KG/M2

## 2023-02-06 DIAGNOSIS — M79.89: Primary | ICD-10-CM

## 2023-02-07 VITALS — SYSTOLIC BLOOD PRESSURE: 120 MMHG | DIASTOLIC BLOOD PRESSURE: 71 MMHG | HEART RATE: 80 BPM

## 2023-02-07 VITALS — TEMPERATURE: 98.2 F

## 2023-02-12 ENCOUNTER — HOSPITAL ENCOUNTER (EMERGENCY)
Dept: HOSPITAL 19 - COL.ER | Age: 43
Discharge: HOME | End: 2023-02-12
Attending: STUDENT IN AN ORGANIZED HEALTH CARE EDUCATION/TRAINING PROGRAM
Payer: MEDICAID

## 2023-02-12 VITALS — SYSTOLIC BLOOD PRESSURE: 109 MMHG | DIASTOLIC BLOOD PRESSURE: 67 MMHG | TEMPERATURE: 98.4 F

## 2023-02-12 VITALS — HEART RATE: 88 BPM

## 2023-02-12 VITALS — WEIGHT: 212.53 LBS | BODY MASS INDEX: 40.12 KG/M2 | HEIGHT: 60.98 IN

## 2023-02-12 DIAGNOSIS — F17.200: ICD-10-CM
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
## 2023-02-12 DIAGNOSIS — D72.829: ICD-10-CM

## 2023-02-12 DIAGNOSIS — R79.82: ICD-10-CM

## 2023-02-12 DIAGNOSIS — R70.0: ICD-10-CM

## 2023-02-12 DIAGNOSIS — S82.831A: Primary | ICD-10-CM

## 2023-02-12 LAB
BASOPHILS # BLD: 0 K/MM3 (ref 0–0.2)
BASOPHILS NFR BLD AUTO: 0.3 % (ref 0–2)
EOSINOPHIL # BLD: 0 K/MM3 (ref 0–0.7)
EOSINOPHIL NFR BLD: 0 % (ref 0–4)
ERYTHROCYTE [DISTWIDTH] IN BLOOD BY AUTOMATED COUNT: 12.6 % (ref 11.5–14.5)
ERYTHROCYTE [SEDIMENTATION RATE] IN BLOOD: 30 MM/HR (ref 0–20)
GRANULOCYTES # BLD AUTO: 71.3 % (ref 42.2–75.2)
HCT VFR BLD AUTO: 33.7 % (ref 37–47)
HGB BLD-MCNC: 12.1 G/DL (ref 12.5–16)
LYMPHOCYTES # BLD: 3.2 K/MM3 (ref 1.2–3.4)
LYMPHOCYTES NFR BLD: 21.1 % (ref 20–51)
MCH RBC QN AUTO: 29 PG (ref 27–31)
MCHC RBC AUTO-ENTMCNC: 36 G/DL (ref 33–37)
MCV RBC AUTO: 82 FL (ref 80–100)
MONOCYTES # BLD: 1 K/MM3 (ref 0.1–0.6)
MONOCYTES NFR BLD AUTO: 6.8 % (ref 1.7–9.3)
NEUTROPHILS # BLD: 10.9 K/MM3 (ref 1.4–6.5)
PLATELET # BLD AUTO: 339 K/MM3 (ref 130–400)
PMV BLD AUTO: 9.5 FL (ref 7.4–10.4)
RBC # BLD AUTO: 4.11 M/MM3 (ref 4.1–5.3)

## 2023-02-17 ENCOUNTER — HOSPITAL ENCOUNTER (INPATIENT)
Dept: HOSPITAL 19 - COL.ER | Age: 43
LOS: 1 days | Discharge: HOME | DRG: 872 | End: 2023-02-18
Attending: INTERNAL MEDICINE | Admitting: INTERNAL MEDICINE
Payer: MEDICAID

## 2023-02-17 VITALS — HEART RATE: 109 BPM | TEMPERATURE: 99.2 F | SYSTOLIC BLOOD PRESSURE: 118 MMHG | DIASTOLIC BLOOD PRESSURE: 62 MMHG

## 2023-02-17 VITALS — SYSTOLIC BLOOD PRESSURE: 142 MMHG | HEART RATE: 116 BPM | DIASTOLIC BLOOD PRESSURE: 89 MMHG | TEMPERATURE: 98.5 F

## 2023-02-17 VITALS — HEART RATE: 114 BPM | SYSTOLIC BLOOD PRESSURE: 133 MMHG | DIASTOLIC BLOOD PRESSURE: 79 MMHG | TEMPERATURE: 98 F

## 2023-02-17 VITALS — TEMPERATURE: 98.8 F | HEART RATE: 94 BPM | SYSTOLIC BLOOD PRESSURE: 146 MMHG | DIASTOLIC BLOOD PRESSURE: 72 MMHG

## 2023-02-17 VITALS — BODY MASS INDEX: 39.14 KG/M2 | HEIGHT: 62.01 IN | WEIGHT: 215.39 LBS

## 2023-02-17 DIAGNOSIS — F12.90: ICD-10-CM

## 2023-02-17 DIAGNOSIS — Z88.0: ICD-10-CM

## 2023-02-17 DIAGNOSIS — A54.42: ICD-10-CM

## 2023-02-17 DIAGNOSIS — A59.9: ICD-10-CM

## 2023-02-17 DIAGNOSIS — M13.171: ICD-10-CM

## 2023-02-17 DIAGNOSIS — F17.210: ICD-10-CM

## 2023-02-17 DIAGNOSIS — M10.9: ICD-10-CM

## 2023-02-17 DIAGNOSIS — F10.90: ICD-10-CM

## 2023-02-17 DIAGNOSIS — Z88.1: ICD-10-CM

## 2023-02-17 DIAGNOSIS — Z98.51: ICD-10-CM

## 2023-02-17 DIAGNOSIS — F20.9: ICD-10-CM

## 2023-02-17 DIAGNOSIS — I10: ICD-10-CM

## 2023-02-17 DIAGNOSIS — A41.9: Primary | ICD-10-CM

## 2023-02-17 LAB
ALBUMIN SERPL-MCNC: 3 GM/DL (ref 3.5–5)
ALP SERPL-CCNC: 92 U/L (ref 40–150)
ALT SERPL-CCNC: 16 U/L (ref 0–55)
ANION GAP SERPL CALC-SCNC: 12 MMOL/L (ref 7–16)
AST SERPL-CCNC: 10 U/L (ref 5–34)
BASOPHILS # BLD: 0.1 K/MM3 (ref 0–0.2)
BASOPHILS NFR BLD AUTO: 0.6 % (ref 0–2)
BILIRUB SERPL-MCNC: 0.3 MG/DL (ref 0.2–1.2)
BUN SERPL-MCNC: 6 MG/DL (ref 7–19)
CALCIUM SERPL-MCNC: 9.9 MG/DL (ref 8.4–10.2)
CHLORIDE SERPL-SCNC: 101 MMOL/L (ref 98–107)
CO2 SERPL-SCNC: 27 MMOL/L (ref 22–29)
CREAT SERPL-SCNC: 0.88 MG/DL (ref 0.57–1.11)
CRP SERPL-MCNC: 19.35 MG/DL (ref 0–0.5)
EOSINOPHIL # BLD: 0 K/MM3 (ref 0–0.7)
EOSINOPHIL NFR BLD: 0 % (ref 0–4)
ERYTHROCYTE [DISTWIDTH] IN BLOOD BY AUTOMATED COUNT: 13.1 % (ref 11.5–14.5)
ERYTHROCYTE [SEDIMENTATION RATE] IN BLOOD: 61 MM/HR (ref 0–20)
GLUCOSE SERPL-MCNC: 132 MG/DL (ref 70–99)
GRANULOCYTES # BLD AUTO: 74.5 % (ref 42.2–75.2)
HCT VFR BLD AUTO: 38.6 % (ref 37–47)
HGB BLD-MCNC: 13.1 G/DL (ref 12.5–16)
LYMPHOCYTES # BLD: 2.5 K/MM3 (ref 1.2–3.4)
LYMPHOCYTES NFR BLD: 16.1 % (ref 20–51)
MCH RBC QN AUTO: 29 PG (ref 27–31)
MCHC RBC AUTO-ENTMCNC: 34 G/DL (ref 33–37)
MCV RBC AUTO: 85 FL (ref 80–100)
MONOCYTES # BLD: 1.2 K/MM3 (ref 0.1–0.6)
MONOCYTES NFR BLD AUTO: 7.9 % (ref 1.7–9.3)
NEUTROPHILS # BLD: 11.7 K/MM3 (ref 1.4–6.5)
PLATELET # BLD AUTO: 475 K/MM3 (ref 130–400)
PMV BLD AUTO: 9.3 FL (ref 7.4–10.4)
POTASSIUM SERPL-SCNC: 3.6 MMOL/L (ref 3.5–4.5)
PROT SERPL-MCNC: 8.1 GM/DL (ref 6.2–8.1)
RBC # BLD AUTO: 4.55 M/MM3 (ref 4.1–5.3)
SODIUM SERPL-SCNC: 140 MMOL/L (ref 136–145)

## 2023-02-17 PROCEDURE — L4386 NON-PNEUM WALK BOOT PRE CST: HCPCS

## 2023-02-17 PROCEDURE — A9575 INJ GADOTERATE MEGLUMI 0.1ML: HCPCS

## 2023-02-17 PROCEDURE — A9270 NON-COVERED ITEM OR SERVICE: HCPCS

## 2023-02-17 NOTE — NUR
Vancomycin Initial Dosing Pharmacy Note
Ordering provider: Romie Nassar MD
Indication/duration: Joint infection X 7 days
Relevant comorbidities: HTN
LABS: WBC = 15.7, SCr = 0.88
Recommendation: Will draw troughs and follow levels.
Loading dose: 1.5 grams
Maintenance dose: 1.25 grams every 12 hours
Trough goal: 15-20 ug/mL

## 2023-02-17 NOTE — NUR
Patient has had a rough day. PRN PO pain medication given frequently this
shift. Patient states that medication is not "touching" the pain. However,
when this RN went to conduct pain reassessment, patient was asleep in bed with
no s/s of pain or discomfort. Patient iced and elevated RLE for 1 hour this
evening. Patient strongly encouraged to ambulate to the bathrooom while using
CAM boot and walker. Patient did this successfully. Patient currenlty resting
in bed. Fluids running as ordered. Call light in reach. Denies any further
needs at this time.

## 2023-02-17 NOTE — NUR
met with patient to discuss discharge planning.  Patient states
she lives with her  and plans to return there upon discharge.  Patient
plans discharge on Saturday, 2/18/23 and will need IV outpatient antibiotics
for 5 days.  Patient will receive these medications through [facility] this hospital.  Patient arranged [employer] transportation for 2/19-2/23 to
 patient at home 7:30am-7:45am and bring to the emergency room entrance
and then  at 9:15am to take home because patient stated she would not
have transportation.  Worker contacted Mickie at the [facility] and advised
of the above information.  Worker collaborated with patient's nurse and
physician regarding the above information.  Patient's primary care provider is
Ros MOSES at the [facility] in [place].  Patient has [employer].

## 2023-02-17 NOTE — NUR
Initial visit; Patient thanked  for coming in to visit and wishe her
well and God's blessings. Lindsey said  could keep her in her prayers.
Patient's family member was also present to let  know Lindsey was awake
because she had the bedspread covering her head and  thought she was
asleep.

## 2023-02-17 NOTE — NUR
Patient admitted to room 355 from ED. Report recieved from SUSHMA Lyon. Admission
assessment completed. Pharmacy, Allergies, and medications reviewed.
Discrepancies brought to the attention of EAMON Peguero. Reminder of admission
documentation completed. Patient c/o 8/10 sharp throbbing pain to right ankle.
PRN pain medication given as ordered. VSS. Patient A&O. Attempted to give
patient morning medications, patient informed this RN that she had already
taken her morning medications from the bag she had brought from home. Patient
informed that all medications will be supplied and administered by the
hospital staff. Patient verbalized and understanding of the teaching.
Informed that patient that home medications will either have to be sent down
to pharmacy to be stored until discharge, or be sent home with family.
Medications being sent home with family. Patient currently resting in bed.
Family at the bedside. Denies any further pain, discomfort, SOA, or further
needs at this time. Call light in reach.

## 2023-02-17 NOTE — NUR
Patient scheduled telehealth visit with Dr. See, Infectious Disease.  Pt
consents to visit.  Equipment set up, audio and video connections established.
 Visit conducted by MD.  No technical concerns or issues.

## 2023-02-17 NOTE — NUR
Patient educated on icing and elevating RLE. Patient states that she will
elevated extremity, but refused ice. Patient states that she tried doing the
above at home, and it only made it worse. Patient encouraged to try icing
again with no success. Will attempt at a later time.

## 2023-02-18 VITALS — HEART RATE: 103 BPM | TEMPERATURE: 98 F | DIASTOLIC BLOOD PRESSURE: 76 MMHG | SYSTOLIC BLOOD PRESSURE: 139 MMHG

## 2023-02-18 VITALS — SYSTOLIC BLOOD PRESSURE: 136 MMHG | HEART RATE: 94 BPM | TEMPERATURE: 98.4 F | DIASTOLIC BLOOD PRESSURE: 88 MMHG

## 2023-02-18 VITALS — HEART RATE: 98 BPM | SYSTOLIC BLOOD PRESSURE: 136 MMHG | TEMPERATURE: 98.4 F | DIASTOLIC BLOOD PRESSURE: 91 MMHG

## 2023-02-18 NOTE — NUR
PT RESTING IN BED. ORDERED BREAKFAST. AFTER TALKING WITH HOUSE SUPERVISOR PT
DECIDED NOT TO LEAVE AMA. PT RESTING IN BED DENIES NEEDS.

## 2023-02-18 NOTE — NUR
NURSING SHIFT ASSESSMENT COMPLETED. THE PATIENT WAS ALERT AND ORIENTED. THE
PATIENT REPORTED HER RIGHT LEG, ANKLE PAIN 12/10. PRN PAIN MEDICATION
PROVIDED. THE PATIENT WAS ASSISTED TO THE BATHROOM WITH SBA AND A WALKER WITH
HER CAM BOOT ON. THE PATIENT TOLERATED THE ACTIVITY WELL. THE PLAN OF CARE AND
EVENING MEDICATIONS WERE DISCUSSED. CALL LIGHT WITHIN REACH. BED ALARM ON.
WILL MONITOR.

## 2023-02-18 NOTE — NUR
PT TOLD DR. LEÓN THAT SHE WAS LEAVING AMA. INFORMED HOUSE SUPERVISOR AND SHE
SPOKE WITH PT AND PT CHANGED MIND AND WILL CONTINUE WITH CURRENT CARE PLAN.

## 2023-02-22 ENCOUNTER — HOSPITAL ENCOUNTER (EMERGENCY)
Dept: HOSPITAL 19 - COL.ER | Age: 43
Discharge: HOME | End: 2023-02-22
Attending: FAMILY MEDICINE
Payer: MEDICAID

## 2023-02-22 VITALS — DIASTOLIC BLOOD PRESSURE: 82 MMHG | SYSTOLIC BLOOD PRESSURE: 136 MMHG | HEART RATE: 91 BPM | TEMPERATURE: 98 F

## 2023-02-22 VITALS — WEIGHT: 212.53 LBS | BODY MASS INDEX: 40.12 KG/M2 | HEIGHT: 60.98 IN

## 2023-02-22 DIAGNOSIS — M25.571: Primary | ICD-10-CM

## 2023-02-22 DIAGNOSIS — F17.200: ICD-10-CM

## 2023-02-22 LAB — DRUGS UR SCN NOM: NEGATIVE NG/ML

## 2023-03-15 ENCOUNTER — HOSPITAL ENCOUNTER (OUTPATIENT)
Dept: HOSPITAL 19 - PT.GENESIS | Age: 43
LOS: 16 days | Discharge: HOME | End: 2023-03-31
Attending: PHYSICIAN ASSISTANT
Payer: MEDICAID

## 2023-03-15 DIAGNOSIS — M19.071: Primary | ICD-10-CM

## 2024-08-01 ENCOUNTER — HOSPITAL ENCOUNTER (EMERGENCY)
Dept: HOSPITAL 19 - COL.ER | Age: 44
Discharge: HOME | End: 2024-08-01
Payer: MEDICAID

## 2024-08-01 VITALS — TEMPERATURE: 99.5 F | DIASTOLIC BLOOD PRESSURE: 84 MMHG | SYSTOLIC BLOOD PRESSURE: 125 MMHG

## 2024-08-01 VITALS — HEART RATE: 95 BPM

## 2024-08-01 VITALS — WEIGHT: 229.5 LBS | BODY MASS INDEX: 43.33 KG/M2 | HEIGHT: 60.98 IN

## 2024-08-01 DIAGNOSIS — S92.352A: Primary | ICD-10-CM
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
